# Patient Record
Sex: FEMALE
[De-identification: names, ages, dates, MRNs, and addresses within clinical notes are randomized per-mention and may not be internally consistent; named-entity substitution may affect disease eponyms.]

---

## 2023-03-07 ENCOUNTER — APPOINTMENT (OUTPATIENT)
Dept: ORTHOPEDIC SURGERY | Facility: CLINIC | Age: 58
End: 2023-03-07
Payer: OTHER GOVERNMENT

## 2023-03-07 VITALS — HEIGHT: 62 IN

## 2023-03-07 DIAGNOSIS — M54.50 LOW BACK PAIN, UNSPECIFIED: ICD-10-CM

## 2023-03-07 DIAGNOSIS — M54.2 CERVICALGIA: ICD-10-CM

## 2023-03-07 PROCEDURE — 99204 OFFICE O/P NEW MOD 45 MIN: CPT

## 2023-03-07 NOTE — ASSESSMENT
[FreeTextEntry1] : 57 F with neck and back pain.  Multilevel DD. Filed PT, NSAIDS.  Cant get JOSEPH because had migraines in past. \par Pain management referral for possible facet injections vs RFA vs intracept \par FU if injections fail

## 2023-03-07 NOTE — IMAGING
[de-identified] : Complete Spine:\par \par Inspection/Palpation\par TTP over bilateral trapezius, paraspinal and midline lumbar and right lumbar paraspinal.  \par No bony step offs. No lesions.\par \par \par Gait:\par Antalgic, able to perform bilateral toe and heel rise. Able to perform tandem gait. \par \par Range of Motion:\par Cervical Spine: Flexion to chin to chest, extension to 10 degrees, rotation 45 degrees bilaterally, Lateral flexion to 10 degrees bilaterally\par \par Range of Motion:\par Lumbar Spine: Flexion to 45 degrees, Extension to 10 degrees, rotation 30 degrees bilaterally, lateral flexion to 30 degrees bilaterally.\par \par Neurologic:\par Bilateral upper extremities 5/5 Deltoid/Biceps/Triceps/ Wrist Flexion/Wrist Extension/ / Intrinsics \par \par Neurologic:\par Bilateral Lower Extremities 5/5 Iliopsoas/Quadriceps/Hamstrings/ Tibialis Anterior/ Gastrocnemius. Extensor Hallucis Longus/ Flexor Hallucis Longus \par \par Sensation intact to light touch C5-T1\par Sensation intact to light touch L2-L5, decreased sensation S1\par \par Biceps/Triceps 3+, 2+ Brachioradialis Reflex\par Patellar 3+ reflex,  Achilles reflex within normal limits.\par \par Negative Tilley's, + left inverted brachioradials reflex\par Negative straight leg raise\par \par No pain with IR/ER/Flexion of Hips bilaterally \par \par \par MRI C and L spine reviewed and interpreted today from [facility].  Reports noted in chart. \par Multilevel DDD. Nos painl cord compression.
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
